# Patient Record
Sex: MALE | Race: WHITE | URBAN - METROPOLITAN AREA
[De-identification: names, ages, dates, MRNs, and addresses within clinical notes are randomized per-mention and may not be internally consistent; named-entity substitution may affect disease eponyms.]

---

## 2020-03-02 ENCOUNTER — OFFICE VISIT (OUTPATIENT)
Dept: URBAN - METROPOLITAN AREA CLINIC 76 | Facility: CLINIC | Age: 40
End: 2020-03-02
Payer: COMMERCIAL

## 2020-03-02 PROCEDURE — 99213 OFFICE O/P EST LOW 20 MIN: CPT | Performed by: OPTOMETRIST

## 2020-03-02 RX ORDER — TRIFLURIDINE 1 G/100ML
1 % SOLUTION OPHTHALMIC
Qty: 10 | Refills: 0 | Status: INACTIVE
Start: 2020-03-02 | End: 2021-01-25

## 2020-03-02 ASSESSMENT — INTRAOCULAR PRESSURE
OS: 18
OD: 16

## 2020-03-02 NOTE — IMPRESSION/PLAN
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 Plan: Discussed diagnosis in detail with patient. Start Trifluridine Q1hr while wake followed by PF AT's. Avoid sharing towels and anything that touches the face, can take Tylenol for discomfort. Pt to call if symptoms worsen.

## 2020-03-09 ENCOUNTER — OFFICE VISIT (OUTPATIENT)
Dept: URBAN - METROPOLITAN AREA CLINIC 76 | Facility: CLINIC | Age: 40
End: 2020-03-09
Payer: COMMERCIAL

## 2020-03-09 PROCEDURE — 99213 OFFICE O/P EST LOW 20 MIN: CPT | Performed by: OPTOMETRIST

## 2020-03-09 ASSESSMENT — INTRAOCULAR PRESSURE
OS: 17
OD: 14

## 2020-03-09 NOTE — IMPRESSION/PLAN
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 Resolving Plan: Discussed diagnosis in detail with patient. Taper Trifluridine Q2hr for 3 days, then Q3hrs until seen. Continue PF AT's 3-4 times per day. Pt to call if symptoms worsen.

## 2020-03-16 ENCOUNTER — OFFICE VISIT (OUTPATIENT)
Dept: URBAN - METROPOLITAN AREA CLINIC 76 | Facility: CLINIC | Age: 40
End: 2020-03-16
Payer: COMMERCIAL

## 2020-03-16 DIAGNOSIS — B00.52 HERPESVIRAL KERATITIS: Primary | ICD-10-CM

## 2020-03-16 PROCEDURE — 99213 OFFICE O/P EST LOW 20 MIN: CPT | Performed by: OPTOMETRIST

## 2020-03-16 ASSESSMENT — INTRAOCULAR PRESSURE
OS: 16
OD: 16

## 2020-03-16 NOTE — IMPRESSION/PLAN
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 resolved Plan: Continue to monitor. Taper Trifluridine QID x 4 days then D/C. Continue PF AT's 4 times per day. Pt to call if symptoms worsen.

## 2021-01-25 ENCOUNTER — OFFICE VISIT (OUTPATIENT)
Dept: URBAN - METROPOLITAN AREA CLINIC 76 | Facility: CLINIC | Age: 41
End: 2021-01-25
Payer: COMMERCIAL

## 2021-01-25 DIAGNOSIS — H00.012 HORDEOLUM EXTERNUM RIGHT LOWER EYELID: Primary | ICD-10-CM

## 2021-01-25 PROCEDURE — 99213 OFFICE O/P EST LOW 20 MIN: CPT | Performed by: OPTOMETRIST

## 2021-01-25 RX ORDER — AMOXICILLIN AND CLAVULANATE POTASSIUM 500; 125 MG/1; 1/1
TABLET, FILM COATED ORAL
Qty: 30 | Refills: 0 | Status: INACTIVE
Start: 2021-01-25 | End: 2021-01-28

## 2021-01-25 ASSESSMENT — INTRAOCULAR PRESSURE
OD: 17
OS: 17

## 2021-01-25 NOTE — IMPRESSION/PLAN
Impression: Hordeolum externum right lower eyelid: H00.012. Right. Plan: Discussed diagnosis in detail with patient. Start focal heat with a hard boiled egg or tea bag TID for at least 15 minutes a day. Start Augmentin 500mg tab 3 times a day for 10 days. Recommend taking the antibiotics 15 minutes before doing the focal heat. Advised patient that the hordeolum may come to a head and drain, recommend using a warm wash cloth to clean any of the discharge.